# Patient Record
Sex: MALE | Race: BLACK OR AFRICAN AMERICAN | NOT HISPANIC OR LATINO | Employment: OTHER | URBAN - METROPOLITAN AREA
[De-identification: names, ages, dates, MRNs, and addresses within clinical notes are randomized per-mention and may not be internally consistent; named-entity substitution may affect disease eponyms.]

---

## 2024-02-25 ENCOUNTER — APPOINTMENT (OUTPATIENT)
Dept: DIALYSIS | Facility: HOSPITAL | Age: 29
End: 2024-02-25
Payer: MEDICAID

## 2024-02-25 ENCOUNTER — HOSPITAL ENCOUNTER (INPATIENT)
Facility: HOSPITAL | Age: 29
LOS: 1 days | Discharge: LEFT BEFORE DISCHARGE | End: 2024-02-25
Attending: STUDENT IN AN ORGANIZED HEALTH CARE EDUCATION/TRAINING PROGRAM | Admitting: INTERNAL MEDICINE
Payer: MEDICAID

## 2024-02-25 VITALS
RESPIRATION RATE: 16 BRPM | HEIGHT: 78 IN | SYSTOLIC BLOOD PRESSURE: 165 MMHG | TEMPERATURE: 96.8 F | BODY MASS INDEX: 26.03 KG/M2 | DIASTOLIC BLOOD PRESSURE: 110 MMHG | OXYGEN SATURATION: 100 % | WEIGHT: 225 LBS | HEART RATE: 66 BPM

## 2024-02-25 DIAGNOSIS — E87.5 HYPERKALEMIA: Primary | ICD-10-CM

## 2024-02-25 DIAGNOSIS — N18.6 ESRD (END STAGE RENAL DISEASE) (MULTI): ICD-10-CM

## 2024-02-25 LAB
ANION GAP SERPL CALC-SCNC: 15 MMOL/L
APTT PPP: 28 SECONDS (ref 22–32.5)
BASOPHILS # BLD AUTO: 0.01 X10*3/UL (ref 0–0.1)
BASOPHILS NFR BLD AUTO: 0.5 %
BUN SERPL-MCNC: 76 MG/DL (ref 8–25)
CALCIUM SERPL-MCNC: 7.2 MG/DL (ref 8.5–10.4)
CHLORIDE SERPL-SCNC: 103 MMOL/L (ref 97–107)
CO2 SERPL-SCNC: 24 MMOL/L (ref 24–31)
CREAT SERPL-MCNC: 19.1 MG/DL (ref 0.4–1.6)
EGFRCR SERPLBLD CKD-EPI 2021: 3 ML/MIN/1.73M*2
EOSINOPHIL # BLD AUTO: 0.06 X10*3/UL (ref 0–0.7)
EOSINOPHIL NFR BLD AUTO: 2.7 %
ERYTHROCYTE [DISTWIDTH] IN BLOOD BY AUTOMATED COUNT: 12.1 % (ref 11.5–14.5)
GLUCOSE SERPL-MCNC: 85 MG/DL (ref 65–99)
HCT VFR BLD AUTO: 25.7 % (ref 41–52)
HGB BLD-MCNC: 8.4 G/DL (ref 13.5–17.5)
IMM GRANULOCYTES # BLD AUTO: 0.01 X10*3/UL (ref 0–0.7)
IMM GRANULOCYTES NFR BLD AUTO: 0.5 % (ref 0–0.9)
INR PPP: 1.1 (ref 0.9–1.2)
LYMPHOCYTES # BLD AUTO: 0.79 X10*3/UL (ref 1.2–4.8)
LYMPHOCYTES NFR BLD AUTO: 35.9 %
MCH RBC QN AUTO: 30.9 PG (ref 26–34)
MCHC RBC AUTO-ENTMCNC: 32.7 G/DL (ref 32–36)
MCV RBC AUTO: 95 FL (ref 80–100)
MONOCYTES # BLD AUTO: 0.18 X10*3/UL (ref 0.1–1)
MONOCYTES NFR BLD AUTO: 8.2 %
NEUTROPHILS # BLD AUTO: 1.15 X10*3/UL (ref 1.2–7.7)
NEUTROPHILS NFR BLD AUTO: 52.2 %
NRBC BLD-RTO: 0 /100 WBCS (ref 0–0)
PLATELET # BLD AUTO: 143 X10*3/UL (ref 150–450)
POTASSIUM SERPL-SCNC: 5.9 MMOL/L (ref 3.4–5.1)
PROTHROMBIN TIME: 11.8 SECONDS (ref 9.3–12.7)
RBC # BLD AUTO: 2.72 X10*6/UL (ref 4.5–5.9)
SODIUM SERPL-SCNC: 142 MMOL/L (ref 133–145)
WBC # BLD AUTO: 2.2 X10*3/UL (ref 4.4–11.3)

## 2024-02-25 PROCEDURE — 2500000004 HC RX 250 GENERAL PHARMACY W/ HCPCS (ALT 636 FOR OP/ED): Performed by: INTERNAL MEDICINE

## 2024-02-25 PROCEDURE — 85610 PROTHROMBIN TIME: CPT | Performed by: STUDENT IN AN ORGANIZED HEALTH CARE EDUCATION/TRAINING PROGRAM

## 2024-02-25 PROCEDURE — 2060000001 HC INTERMEDIATE ICU ROOM DAILY

## 2024-02-25 PROCEDURE — 80048 BASIC METABOLIC PNL TOTAL CA: CPT | Performed by: STUDENT IN AN ORGANIZED HEALTH CARE EDUCATION/TRAINING PROGRAM

## 2024-02-25 PROCEDURE — 99285 EMERGENCY DEPT VISIT HI MDM: CPT

## 2024-02-25 PROCEDURE — 85025 COMPLETE CBC W/AUTO DIFF WBC: CPT | Performed by: STUDENT IN AN ORGANIZED HEALTH CARE EDUCATION/TRAINING PROGRAM

## 2024-02-25 PROCEDURE — 5A1D70Z PERFORMANCE OF URINARY FILTRATION, INTERMITTENT, LESS THAN 6 HOURS PER DAY: ICD-10-PCS | Performed by: INTERNAL MEDICINE

## 2024-02-25 PROCEDURE — 85730 THROMBOPLASTIN TIME PARTIAL: CPT | Performed by: STUDENT IN AN ORGANIZED HEALTH CARE EDUCATION/TRAINING PROGRAM

## 2024-02-25 PROCEDURE — 36415 COLL VENOUS BLD VENIPUNCTURE: CPT | Performed by: STUDENT IN AN ORGANIZED HEALTH CARE EDUCATION/TRAINING PROGRAM

## 2024-02-25 PROCEDURE — 8010000001 HC DIALYSIS - HEMODIALYSIS PER DAY

## 2024-02-25 RX ORDER — ONDANSETRON HYDROCHLORIDE 2 MG/ML
4 INJECTION, SOLUTION INTRAVENOUS EVERY 8 HOURS PRN
Status: CANCELLED | OUTPATIENT
Start: 2024-02-25

## 2024-02-25 RX ORDER — ONDANSETRON 4 MG/1
4 TABLET, FILM COATED ORAL EVERY 8 HOURS PRN
Status: CANCELLED | OUTPATIENT
Start: 2024-02-25

## 2024-02-25 RX ORDER — ACETAMINOPHEN 160 MG/5ML
650 SOLUTION ORAL EVERY 4 HOURS PRN
Status: CANCELLED | OUTPATIENT
Start: 2024-02-25

## 2024-02-25 RX ORDER — ACETAMINOPHEN 650 MG/1
650 SUPPOSITORY RECTAL EVERY 4 HOURS PRN
Status: CANCELLED | OUTPATIENT
Start: 2024-02-25

## 2024-02-25 RX ORDER — DEXTROSE 50 % IN WATER (D50W) INTRAVENOUS SYRINGE
25 ONCE
Status: DISCONTINUED | OUTPATIENT
Start: 2024-02-25 | End: 2024-02-26 | Stop reason: HOSPADM

## 2024-02-25 RX ORDER — CALCIUM GLUCONATE 20 MG/ML
1 INJECTION, SOLUTION INTRAVENOUS ONCE
Status: DISCONTINUED | OUTPATIENT
Start: 2024-02-25 | End: 2024-02-26 | Stop reason: HOSPADM

## 2024-02-25 RX ORDER — POLYETHYLENE GLYCOL 3350 17 G/17G
17 POWDER, FOR SOLUTION ORAL DAILY PRN
Status: CANCELLED | OUTPATIENT
Start: 2024-02-25

## 2024-02-25 RX ORDER — TALC
3 POWDER (GRAM) TOPICAL NIGHTLY PRN
Status: CANCELLED | OUTPATIENT
Start: 2024-02-25

## 2024-02-25 RX ORDER — DEXTROSE MONOHYDRATE 100 MG/ML
0.3 INJECTION, SOLUTION INTRAVENOUS ONCE AS NEEDED
Status: DISCONTINUED | OUTPATIENT
Start: 2024-02-25 | End: 2024-02-26 | Stop reason: HOSPADM

## 2024-02-25 RX ORDER — DEXTROSE 50 % IN WATER (D50W) INTRAVENOUS SYRINGE
25
Status: DISCONTINUED | OUTPATIENT
Start: 2024-02-25 | End: 2024-02-26 | Stop reason: HOSPADM

## 2024-02-25 RX ORDER — ACETAMINOPHEN 325 MG/1
650 TABLET ORAL EVERY 4 HOURS PRN
Status: CANCELLED | OUTPATIENT
Start: 2024-02-25

## 2024-02-25 RX ORDER — HYDRALAZINE HYDROCHLORIDE 20 MG/ML
5 INJECTION INTRAMUSCULAR; INTRAVENOUS EVERY 6 HOURS PRN
Status: DISCONTINUED | OUTPATIENT
Start: 2024-02-25 | End: 2024-02-26 | Stop reason: HOSPADM

## 2024-02-25 ASSESSMENT — PAIN - FUNCTIONAL ASSESSMENT: PAIN_FUNCTIONAL_ASSESSMENT: 0-10

## 2024-02-25 ASSESSMENT — LIFESTYLE VARIABLES
EVER FELT BAD OR GUILTY ABOUT YOUR DRINKING: NO
HAVE YOU EVER FELT YOU SHOULD CUT DOWN ON YOUR DRINKING: NO
EVER HAD A DRINK FIRST THING IN THE MORNING TO STEADY YOUR NERVES TO GET RID OF A HANGOVER: NO
HAVE PEOPLE ANNOYED YOU BY CRITICIZING YOUR DRINKING: NO

## 2024-02-25 ASSESSMENT — COLUMBIA-SUICIDE SEVERITY RATING SCALE - C-SSRS
1. IN THE PAST MONTH, HAVE YOU WISHED YOU WERE DEAD OR WISHED YOU COULD GO TO SLEEP AND NOT WAKE UP?: NO
2. HAVE YOU ACTUALLY HAD ANY THOUGHTS OF KILLING YOURSELF?: NO
6. HAVE YOU EVER DONE ANYTHING, STARTED TO DO ANYTHING, OR PREPARED TO DO ANYTHING TO END YOUR LIFE?: NO

## 2024-02-25 ASSESSMENT — PAIN SCALES - GENERAL: PAINLEVEL_OUTOF10: 0 - NO PAIN

## 2024-02-25 NOTE — H&P
History Of Present Illness  Adonay Saunders is a 29 y.o. male presenting with end-stage kidney disease, in need of hemodialysis  He has a history of end-stage renal disease on hemodialysis for the past 5 years.  He typically has hemodialysis done on Mondays, Wednesdays and Fridays.  He had been primarily receiving his care through the [facility] system in [place].  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  He had been off his dialysis schedule and was dialyzed in the hospital in [place] last Thursday which was 3 days ago.  He came into the ED to get dialyzed today, which is Sunday.  He plans to join a team in [place] and establish care there.  He states that the cause of his kidney disease was not clear.  Records from an outside hospital seen through [facility] indicate that there may have been hypertensive nephrosclerosis.  The patient reports that he has tried to have a kidney biopsy done in the past but so far has not successfully had one done yet.  In the ED, he had an elevated potassium of 5.9 and creatinine was elevated at 19.1 reports no shortness of breath or leg swelling.  He takes no medication.  He said he had had elevated blood pressure in the past but not consistently.  He reports that he is allergic to lisinopril.  Once in the past, he was treated for hyperkalemia at another hospital and became hypoglycemic after receiving insulin on 50% dextrose.  He also reported that he had received blood pressure medicines in the past and he had not tolerated them well.  He said subsequently he had not had issues with blood pressure.  His blood pressure was elevated in the ED today at 172/114    Past Medical History  Past Medical History:   Diagnosis Date    End stage renal disease (CMS/[facility])        Surgical History  Past Surgical History:   Procedure Laterality Date    AV FISTULA PLACEMENT      KNEE ARTHROSCOPY W/ ACL RECONSTRUCTION          Social History  He reports that he has never  "smoked. He has never used smokeless tobacco. He reports that he does not drink alcohol and does not use drugs.    Family History  Family History   Problem Relation Name Age of Onset    Other (end stage renal disease) Maternal Grandmother          had a kidney transplant        Allergies  Lisinopril    Review of Systems   General: Negative for fever,  chills or fatigue.    HEENT: Negative for headache, blurring of vision or double vision.    Cardiovascular: Negative for chest pain, palpitations or orthopnea.    Respiratory: Negative for cough, shortness of breath or wheezing.    Gastrointestinal: Negative for nausea, vomiting, hematemesis, abdominal pain or diarrhea.   Genitourinary: Negative for dysuria, hematuria, frequency or nocturia.   Musculoskeletal: Negative for joint pain, joint swelling or deformity.   Skin: Negative for rash, itching, or jaundice.   Hematologic: Negative for bleeding or bruising.   Neurologic: Negative for headache, loss of consciousness. syncope or seizures         Physical Exam   General.: Awake alert well-developed, responsive   HEENT: Normocephalic, not icteric, not pale, no facial asymmetry, no pharyngeal erythema.   Neck: Supple, no carotid bruit, no thyroid enlargement.   Cardiovascular: Regular heart rate and rhythm normal S1 and S2.   Respiratory: Equal breath sounds bilaterally clear to auscultation.   Abdomen: Soft, nontender to palpation, bowel sounds present and normoactive.   Extremities: No peripheral cyanosis, no pedal edema.   Neurologic: Alert and oriented to self, place and date, muscle strength 5/5 in all extremities.   Dermatologic: No rash, ecchymosis, or jaundice.   Psychological: Appropriate affect and behavior.       Last Recorded Vitals  Blood pressure (!) 172/114, pulse 77, temperature 36.2 °C (97.2 °F), temperature source Tympanic, resp. rate 16, height 2.083 m (6' 10\"), weight 102 kg (225 lb), SpO2 100 %.    Relevant Results  Results for orders placed or " performed during the hospital encounter of 02/25/24 (from the past 24 hour(s))   CBC and Auto Differential   Result Value Ref Range    WBC 2.2 (L) 4.4 - 11.3 x10*3/uL    nRBC 0.0 0.0 - 0.0 /100 WBCs    RBC 2.72 (L) 4.50 - 5.90 x10*6/uL    Hemoglobin 8.4 (L) 13.5 - 17.5 g/dL    Hematocrit 25.7 (L) 41.0 - 52.0 %    MCV 95 80 - 100 fL    MCH 30.9 26.0 - 34.0 pg    MCHC 32.7 32.0 - 36.0 g/dL    RDW 12.1 11.5 - 14.5 %    Platelets 143 (L) 150 - 450 x10*3/uL    Neutrophils % 52.2 40.0 - 80.0 %    Immature Granulocytes %, Automated 0.5 0.0 - 0.9 %    Lymphocytes % 35.9 13.0 - 44.0 %    Monocytes % 8.2 2.0 - 10.0 %    Eosinophils % 2.7 0.0 - 6.0 %    Basophils % 0.5 0.0 - 2.0 %    Neutrophils Absolute 1.15 (L) 1.20 - 7.70 x10*3/uL    Immature Granulocytes Absolute, Automated 0.01 0.00 - 0.70 x10*3/uL    Lymphocytes Absolute 0.79 (L) 1.20 - 4.80 x10*3/uL    Monocytes Absolute 0.18 0.10 - 1.00 x10*3/uL    Eosinophils Absolute 0.06 0.00 - 0.70 x10*3/uL    Basophils Absolute 0.01 0.00 - 0.10 x10*3/uL   Basic metabolic panel   Result Value Ref Range    Glucose 85 65 - 99 mg/dL    Sodium 142 133 - 145 mmol/L    Potassium 5.9 (H) 3.4 - 5.1 mmol/L    Chloride 103 97 - 107 mmol/L    Bicarbonate 24 24 - 31 mmol/L    Urea Nitrogen 76 (H) 8 - 25 mg/dL    Creatinine 19.10 (H) 0.40 - 1.60 mg/dL    eGFR 3 (L) >60 mL/min/1.73m*2    Calcium 7.2 (L) 8.5 - 10.4 mg/dL    Anion Gap 15 <=19 mmol/L   Protime-INR   Result Value Ref Range    Protime 11.8 9.3 - 12.7 seconds    INR 1.1 0.9 - 1.2   aPTT   Result Value Ref Range    aPTT 28.0 22.0 - 32.5 seconds          Assessment/Plan   End stage renal disease  Hemodialysis is being arranged by nephrology.    Hyperkalemia  Treatment for hyperkalemia has been ordered: Insulin, 50% dextrose and calcium gluconate.  Ordering hypoglycemia protocol because of previous occurrence of hypoglycemia after he received insulin to treat hyperkalemia    Anemia of chronic disease  Monitor hemoglobin and  hematocrit    Elevated blood pressure  IV hydralazine as needed for elevated systolic blood pressure    Jed Oliveira MD

## 2024-02-25 NOTE — ED PROVIDER NOTES
HPI   Chief Complaint   Patient presents with    Vascular Access Problem     Pt supposed to get dialysis Mon Wed Friday, last had on Thurs. Feels SOB.    requesting dialysis       29-year-old male with end-stage renal disease on dialysis presenting requesting dialysis.  Patient states he is supposed to receive dialysis Monday Wednesday Friday and he last received his dialysis on Thursday.  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  He is stating he is feeling a little bit short of breath but otherwise well.  Denies chest pain.  Denies weakness.  No other acute complaints at this time.                        No data recorded                     Patient History   Past Medical History:   Diagnosis Date    End stage renal disease (CMS/HCC)      Past Surgical History:   Procedure Laterality Date    AV FISTULA PLACEMENT      KNEE ARTHROSCOPY W/ ACL RECONSTRUCTION       Family History   Problem Relation Name Age of Onset    Other (end stage renal disease) Maternal Grandmother          had a kidney transplant     Social History     Tobacco Use    Smoking status: Never    Smokeless tobacco: Never   Substance Use Topics    Alcohol use: Never    Drug use: Never       Physical Exam   ED Triage Vitals [02/25/24 1327]   Temperature Heart Rate Respirations BP   36.2 °C (97.2 °F) 77 16 (!) 172/114      Pulse Ox Temp Source Heart Rate Source Patient Position   100 % Tympanic -- Sitting      BP Location FiO2 (%)     Right arm --       Physical Exam  Vitals and nursing note reviewed.   Constitutional:       Comments: Well-appearing physically fit male on his phone in exam chair in no acute distress   HENT:      Head: Normocephalic and atraumatic.      Nose: Nose normal.      Mouth/Throat:      Mouth: Mucous membranes are moist.      Pharynx: Oropharynx is clear.   Eyes:      Extraocular Movements: Extraocular movements intact.      Pupils: Pupils are equal, round, and reactive  to light.   Cardiovascular:      Rate and Rhythm: Normal rate and regular rhythm.      Heart sounds: Normal heart sounds.   Pulmonary:      Effort: Pulmonary effort is normal.      Breath sounds: Normal breath sounds.   Musculoskeletal:         General: Normal range of motion.   Skin:     General: Skin is warm and dry.   Neurological:      General: No focal deficit present.      Mental Status: He is oriented to person, place, and time.   Psychiatric:         Mood and Affect: Mood normal.         Behavior: Behavior normal.         ED Course & MDM   ED Course as of 02/27/24 0813   Sun Feb 25, 2024   1336 EKG presented to me at 1:36 PM     EKG as interpreted by me shows sinus bradycardia at a rate of 53 bpm, peaked T wave only in V3 but not consistent in other leads, no STEMI, normal axis, normal intervals.   [DH]   1549 Per nephrologist recommendations will give patient D50 3 units iv insulin and 1g calcium gluconate, will admit to observation for patient to receive dialysis [JJ]      ED Course User Index  [DH] Tony Cui MD  [JJ] Anny Plummer PA-C         Diagnoses as of 02/27/24 0813   Hyperkalemia   ESRD (end stage renal disease) (CMS/[facility])       Medical Decision Making  Parts of this chart have been completed using voice recognition software. Please excuse any errors of transcription.  My thought process and reason for plan has been formulated from the time that I saw the patient until the time of disposition and is not specific to one specific moment during their visit and furthermore my MDM encompasses this entire chart and not only this text box.      HPI: Detailed above.    Exam: A medically appropriate exam performed, outlined above, given the known history and presentation.    History obtained from: Patient    EKG: No evidence of acute ischemia or STEMI.    Social Determinants of Health considered during this visit: Lives independently, travels often for work.    Medications given during visit:  Medications  - No data to display     Diagnostic/tests  Labs Reviewed   CBC WITH AUTO DIFFERENTIAL - Abnormal       Result Value    WBC 2.2 (*)     nRBC 0.0      RBC 2.72 (*)     Hemoglobin 8.4 (*)     Hematocrit 25.7 (*)     MCV 95      MCH 30.9      MCHC 32.7      RDW 12.1      Platelets 143 (*)     Neutrophils % 52.2      Immature Granulocytes %, Automated 0.5      Lymphocytes % 35.9      Monocytes % 8.2      Eosinophils % 2.7      Basophils % 0.5      Neutrophils Absolute 1.15 (*)     Immature Granulocytes Absolute, Automated 0.01      Lymphocytes Absolute 0.79 (*)     Monocytes Absolute 0.18      Eosinophils Absolute 0.06      Basophils Absolute 0.01     BASIC METABOLIC PANEL - Abnormal    Glucose 85      Sodium 142      Potassium 5.9 (*)     Chloride 103      Bicarbonate 24      Urea Nitrogen 76 (*)     Creatinine 19.10 (*)     eGFR 3 (*)     Calcium 7.2 (*)     Anion Gap 15     PROTIME-INR - Normal    Protime 11.8      INR 1.1      Narrative:     INR Therapeutic Range: 2.0-3.5   APTT - Normal    aPTT 28.0        No orders to display        Considerations/further MDM:  29-year-old male with end-stage renal disease presenting to ER for dialysis.  During the ER visit the patient is alert and oriented, resting comfortably in hospital chair on his phone, in no acute distress.  His vital signs are significant for hypertension but otherwise within normal limits.  Basic laboratory workup was obtained and remarkable for hyperkalemia of 5.9.  EKG did show a peaked T waves in V3 but otherwise the EKG was unremarkable. I spoke with nephrologist Dr. De La Garza.  We discussed patient case.  He recommended I begin calcium gluconate D50 and insulin for the hyperkalemia.  He requested the patient be admitted for observation for dialysis.  I spoke with the hospitalist on-call.  We discussed patient case.  The patient was admitted for observation to receive dialysis.  He remained stable throughout the ER visit.      Procedure  Procedures     Anny  MICHELE Plummer  02/27/24 0816

## 2024-02-25 NOTE — NURSING NOTE
Pre HD Report from Sending RN:    Report From: Gladys/RAHUL RN  Recent Surgery of Procedure: No  Baseline Level of Consciousness (LOC): Alert X5  Oxygen Use: No  Type: n/a  Diabetic: No  Last BP Med Given Day of Dialysis: See Mar  Last Pain Med Given: See Mar  Lab Tests to be Obtained with Dialysis: No  Blood Transfusion to be Given During Dialysis: No  Available IV Access: Yes  Medications to be Administered During Dialysis: No  Continuous IV Infusion Running: No  Restraints on Currently or in the Last 24 Hours: No  Hand-Off Communication: Verbal Cat/BEBO

## 2024-02-26 NOTE — TREATMENT PLAN
I was called by Dr. Aron De La Garza.  Patient's blood pressure running extremely hypertensive while on dialysis.  Patient refusing all medications.    I went and talked to the patient.  He is a pleasant 29-year-old man.  He is undergoing dialysis and eating josafat crackers.  He explains to me that his blood pressure is always high with dialysis.  He says he has tried many medications for this but always has interactions.  Says that the clonidine with rebound to make his blood pressure worse.  Refuses hydralazine said he had problems with that.  He says he knows the risks of having stroke or even dying but says he will not take any medications because he is learned over time that he does not respond well to them.  He says his nephrologist is aware of this as well.  He tells me that it is his intention to leave AGAINST MEDICAL ADVICE as soon as dialysis is complete.  I again reiterated the fact that with his blood pressure this high he could have a stroke or die.  He says he understands all this but as he is traveling, he has been leaving AGAINST MEDICAL ADVICE at various ERs when it is his dialysis day.  Most recently was in [facility] and left AGAINST MEDICAL ADVICE.  He again reiterates that he understands the risk.    Dialysis tech was present for conversation.  Discussed with the ER nurse.

## 2024-02-26 NOTE — PROGRESS NOTES
I was called about patient’s elevated blood pressure which has been up as high as around 200 systolic on dialysis. I was called by the nurse as the patient says he is allergic to hydralazine, which was the initial PRN ordered. However, patient described his allergy as rebound, and when I asked him about clonidine, he also said rebound. When I tried to educate and explain to the patient that this is not an allergy and explained to him th at a blood pressure this high is dangerous and needs to be treated with something and even offered labetalol , as this high blood pressure has a risk of stroke or death, he said if we try to prescribe anything for blood pressure he is not going to take it. He understands the risks. UF was increased in the meantime. He says he does not take any blood pressure medication at home as he believes that when he was on multiple medications in t he past that this was not helping at all.     Nelson De La Garza MD

## 2024-02-26 NOTE — DISCHARGE SUMMARY
Discharge Diagnosis  Hyperkalemia    Issues Requiring Follow-Up  ESRD  HTN    Discharge Meds     Your medication list      as of February 25, 2024 10:13 PM     You have not been prescribed any medications.         Test Results Pending At Discharge  Pending Labs       No current pending labs.            Hospital Course   This is a 29-year-old man with end-stage renal disease on dialysis who presents to the emergency room for dialysis.  Patient lives in [place] and gets his care through there.  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  When he was in [place] he got dialysis on Thursday.  He presented today to the emergency room to get dialysis.  The emergency room patient was fairly asymptomatic but potassium was 5.9.  He underwent urgent dialysis this evening.  Blood pressure was extremely hypertensive during dialysis.  I went and spoke to him about his blood pressure.  He says he knows that there is risk of stroke and death with high blood pressures.  He refused all medications that were offered.  Dr. De La Garza also try to get patient to take something for his elevated blood pressure and he refused for him as well.  I discussed the case with Dr. De La Garza.  Patient told me that he felt fine and was intending on leaving AGAINST MEDICAL ADVICE as soon as he was done with dialysis.  I explained the risks of leaving without treating hypertension and the importance of establishing care and having regular dialysis.  He left AGAINST MEDICAL ADVICE.    Pertinent Physical Exam At Time of Discharge  Physical Exam  General: alert, no diaphoresis   HENT: mucous membranes moist, external ears normal, no rhinorrhea   Eyes: no icterus or injection, no discharge   GI: abdomen soft, nontender, nondistended, BS present   MSK: no joint effusion or deformity   Skin: no rashes, erythema, or ecchymosis   Neuro: grossly normal cognition, motor strength, sensation      Outpatient Follow-Up  No future appointments.    Discharge time  spent: 35 min    Bekah Schaffer, DO

## 2024-02-26 NOTE — ED NOTES
Spoke with Dr. De La Garza on phone and with pt regarding blood pressure medication. Pt states that he is allergic to all blood pressure medications because his blood pressure rebounds when he takes any of them. Pt refused hydralazine and doctor went over other option with pt, all options refused. Pt aware of risk of heart attack, stroke, and death as informed by Dr. De La Garza. Pt states he refuses any and all blood pressure medication and accepts all risks.     Gladys Bravo RN  02/25/24 2038

## 2024-02-26 NOTE — NURSING NOTE
Post HD Report to Receiving RN:    Report To: Gladys/RN  Time Report Called: Karen [id number]  Hand-Off Communication: Verbal  Complications During Treatment: Yes/ hypertensive entire hd tx, Dr. VIKTOR De La Garza notified patient states he's allergic to bp medication, 2.3L fluid removed  Ultrafiltration Treatment: No  Medications Administered During Dialysis: No  Blood Products Administered During Dialysis: No  Labs Sent During Dialysis: No  Heparin Drip Rate Changes: No    Electronic Signatures:  Cat/BEBO    Last Updated: 9:50 PM by MERRITT FERRER

## 2024-03-14 ENCOUNTER — OV CON (OUTPATIENT)
Dept: URBAN - METROPOLITAN AREA CLINIC 25 | Facility: CLINIC | Age: 29
End: 2024-03-14

## 2025-05-13 ENCOUNTER — OFFICE VISIT (OUTPATIENT)
Dept: URBAN - METROPOLITAN AREA CLINIC 25 | Facility: CLINIC | Age: 30
End: 2025-05-13
Payer: COMMERCIAL

## 2025-05-13 ENCOUNTER — TELEPHONE ENCOUNTER (OUTPATIENT)
Dept: URBAN - METROPOLITAN AREA CLINIC 91 | Facility: CLINIC | Age: 30
End: 2025-05-13

## 2025-05-13 ENCOUNTER — DASHBOARD ENCOUNTERS (OUTPATIENT)
Age: 30
End: 2025-05-13

## 2025-05-13 DIAGNOSIS — K86.89 PANCREATIC PAIN: ICD-10-CM

## 2025-05-13 PROCEDURE — 99204 OFFICE O/P NEW MOD 45 MIN: CPT | Performed by: INTERNAL MEDICINE

## 2025-05-13 NOTE — HPI-TODAY'S VISIT:
31yo pt presents with c/o epigastric pain with findings on imaging suggesting enlarged pancreas. Study is not available for me to review.

## 2025-05-20 ENCOUNTER — LAB OUTSIDE AN ENCOUNTER (OUTPATIENT)
Dept: URBAN - METROPOLITAN AREA CLINIC 19 | Facility: CLINIC | Age: 30
End: 2025-05-20

## 2025-05-20 ENCOUNTER — OFFICE VISIT (OUTPATIENT)
Dept: URBAN - METROPOLITAN AREA MEDICAL CENTER 28 | Facility: MEDICAL CENTER | Age: 30
End: 2025-05-20
Payer: COMMERCIAL

## 2025-05-20 DIAGNOSIS — R93.3 ABN FINDINGS-GI TRACT: ICD-10-CM

## 2025-05-20 LAB
.IONIZED CALCIUM POC: 0.92
BUN POC: 39
CHLORIDE LVL POC: 103
CO2 POC: 26
CREATININE POC: 14.1
GLUCOSE  ISTAT: 89
HEMATOCRIT POC: 32
HEMOBLOBIN POC: 10.9
PERFORMING LAB: (no result)
POTASSIUM POC: 4.4
SODIUM LVL POC: 143

## 2025-05-20 PROCEDURE — 43259 EGD US EXAM DUODENUM/JEJUNUM: CPT | Performed by: INTERNAL MEDICINE

## 2025-06-10 ENCOUNTER — OFFICE VISIT (OUTPATIENT)
Dept: URBAN - METROPOLITAN AREA CLINIC 25 | Facility: CLINIC | Age: 30
End: 2025-06-10

## 2025-06-12 ENCOUNTER — OFFICE VISIT (OUTPATIENT)
Dept: URBAN - METROPOLITAN AREA CLINIC 25 | Facility: CLINIC | Age: 30
End: 2025-06-12

## 2025-06-26 ENCOUNTER — OFFICE VISIT (OUTPATIENT)
Dept: URBAN - METROPOLITAN AREA CLINIC 25 | Facility: CLINIC | Age: 30
End: 2025-06-26
Payer: COMMERCIAL

## 2025-06-26 DIAGNOSIS — K59.04 CHRONIC IDIOPATHIC CONSTIPATION: ICD-10-CM

## 2025-06-26 DIAGNOSIS — R16.1 SPLEEN ENLARGED: ICD-10-CM

## 2025-06-26 PROBLEM — 82934008: Status: ACTIVE | Noted: 2025-06-26

## 2025-06-26 PROCEDURE — 99214 OFFICE O/P EST MOD 30 MIN: CPT

## 2025-06-26 RX ORDER — LACTULOSE 10 G/15ML
15 ML AS NEEDED SOLUTION ORAL ONCE A DAY
Qty: 450 | OUTPATIENT
Start: 2025-06-26

## 2025-06-26 NOTE — HPI-TODAY'S VISIT:
06/25 OV  S/p EUS for enlarged pancreas/RUQ paib, EUS was unremarkable, pateint reprots he wakes up in the AM w/ a sour taste in his mouth and pressure like disocmfort in his epigastric region. Patient reports worsening constipation as of late, he tried an enema recently w/ good efficacy, however, as of late he is having incomplete evacuation he also admits to straining, no hard stools, Gonzales stool type 5/6. He has not tried Miralax.

## 2025-07-01 ENCOUNTER — TELEPHONE ENCOUNTER (OUTPATIENT)
Dept: URBAN - METROPOLITAN AREA CLINIC 6 | Facility: CLINIC | Age: 30
End: 2025-07-01

## 2025-08-14 ENCOUNTER — OFFICE VISIT (OUTPATIENT)
Dept: URBAN - METROPOLITAN AREA CLINIC 25 | Facility: CLINIC | Age: 30
End: 2025-08-14